# Patient Record
Sex: MALE | Race: WHITE
[De-identification: names, ages, dates, MRNs, and addresses within clinical notes are randomized per-mention and may not be internally consistent; named-entity substitution may affect disease eponyms.]

---

## 2018-04-25 ENCOUNTER — HOSPITAL ENCOUNTER (EMERGENCY)
Dept: HOSPITAL 25 - UCCORT | Age: 68
Discharge: HOME | End: 2018-04-25
Payer: MEDICARE

## 2018-04-25 VITALS — SYSTOLIC BLOOD PRESSURE: 130 MMHG | DIASTOLIC BLOOD PRESSURE: 89 MMHG

## 2018-04-25 DIAGNOSIS — J40: Primary | ICD-10-CM

## 2018-04-25 PROCEDURE — G0463 HOSPITAL OUTPT CLINIC VISIT: HCPCS

## 2018-04-25 PROCEDURE — 99212 OFFICE O/P EST SF 10 MIN: CPT

## 2018-04-25 PROCEDURE — 71046 X-RAY EXAM CHEST 2 VIEWS: CPT

## 2018-04-25 NOTE — UC
Respiratory Complaint HPI





- HPI Summary


HPI Summary: 





Pt presents with dry cough for the last 1.5 weeks. He was prescribed tessalon 

without being seen by a friend of his. Helps some, but cough still persisting. 

Also mentions that over the last 2 weeks he has last 15 pounds without trying - 

says he just doesnt feel like eating. Denies fever, chills, sore throat, chest 

pain, ORTEGA, SOB, abdominal pain, n/v/d/c.








- History of Current Complaint


Stated Complaint: COUGH


Time Seen by Provider: 04/25/18 12:13


Hx Obtained From: Patient


Onset/Duration: Gradual Onset


Character: Cough: Nonproductive





- Allergies/Home Medications


Allergies/Adverse Reactions: 


 Allergies











Allergy/AdvReac Type Severity Reaction Status Date / Time


 


No Known Allergies Allergy   Verified 04/25/18 12:12











Home Medications: 


 Home Medications





Aspirin 81 mg CHEW TAB* [Aspirin Low Dose TAB*] 81 mg PO DAILY 04/25/18 [

History Confirmed 04/25/18]


Atorvastatin* [Lipitor*] 10 mg PO DAILY 04/25/18 [History Confirmed 04/25/18]


Benzonatate CAP* [Tessalon 100 MG CAP*] 100 mg PO TID PRN 04/25/18 [History 

Confirmed 04/25/18]


Cholecalciferol TAB* [Vitamin D TAB*] 1,000 unit PO DAILY 04/25/18 [History 

Confirmed 04/25/18]


DULoxetine DR CAP* [Cymbalta CAP*] 60 mg PO DAILY 04/25/18 [History Confirmed 04 /25/18]


Folic Acid TAB* [Folvite TAB*] 1 mg PO DAILY 04/25/18 [History Confirmed 04/25/ 18]


Hydroxychloroquine TAB* [Plaquenil TAB*] 200 mg PO BID 04/25/18 [History 

Confirmed 04/25/18]


Methotrexate TAB* 3 tab PO Q7D 04/25/18 [History Confirmed 04/25/18]











PMH/Surg Hx/FS Hx/Imm Hx





- Additional Past Medical History


Additional PMH: 





Rheumatoid arthritis


Endocrine History: Dyslipidemia


Cardiovascular History: Hypertension





- Family History


Known Family History: Positive: Hypertension





- Social History


Occupation: Retired


Lives: With Family


Alcohol Use: Occasionally


Substance Use Type: None





Review of Systems


Constitutional: Negative


Skin: Negative


Eyes: Negative


ENT: Negative


Respiratory: Cough


Cardiovascular: Negative


Gastrointestinal: Negative


Neurovascular: Negative


Musculoskeletal: Negative


Neurological: Negative


Psychological: Negative


All Other Systems Reviewed And Are Negative: Yes





Physical Exam





- Summary


Physical Exam Summary: 





GENERAL: NAD. WDWN. No pain distress.


SKIN: No rashes, sores, ulcers, masses, lesions.


HEENT:


            Head: AT/NC


            Eyes:  EOM intact. Conjunctiva clear without inflammation or 

discharge.


            Ears: Hearing grossly normal. TMs intact, no bulging, erythema, or 

edema. 


            Nose: Nasal mucosa pink and moist. NTTP maxillary and frontal 

sinus. 


            Throat: Posterior oropharynx without exudates, erythema, or 

tonsillar enlargement.  Uvula midline.


NECK: Supple. Nontender. No lymphadenopathy. 


CHEST:  CTAB. No r/r/w. No accessory muscle use. Breathing comfortably and in 

no distress.


CV:  RRR. Without m/r/g. Pulses intact. Brisk cap refill.


NEURO: Alert. CN II-XII grossly intact.


PSYCH: Age appropriate behavior.


Triage Information Reviewed: Yes





Respiratory Course/Dx





- Course


Course Of Treatment: CXR: IMPRESSION: FINDINGS CONSISTENT WITH COPD, NO 

EVIDENCE FOR ACUTE FINDING.  Will rx for prednisone and cough syrup at bedtime 

and have him f/u with his PCP for his unintentional weight loss.





- Differential Dx/Diagnosis


Provider Diagnoses: Bronchitis





Discharge





- Sign-Out/Discharge


Documenting (check all that apply): Discharge/Admit/Transfer





- Discharge Plan


Condition: Stable


Disposition: HOME


Prescriptions: 


Codeine Phosphate/Guaifenesin [Guaifen-Codeine 100-10 mg/5 ml] 5 ml PO BEDTIME 

PRN #35 ml MDD 5mL


 PRN Reason: Cough


predniSONE TAB* [Deltasone TAB*] 50 mg PO DAILY #5 tab


Patient Education Materials:  Acute Bronchitis (ED)


Referrals: 


Christopher Haq MD [Primary Care Provider] - 


Additional Instructions: 


If you develop a fever, shortness of breath, chest pain, new or worsening 

symptoms - please call your PCP or go to the ED.


 





Your blood pressure was mildly elevated at todays visit. Please see your 

primary provider within 4 weeks for recheck and re-evaluation.








1) Please schedule a follow up within the next month to see your PCP regarding 

your recently unintentional weight loss.





- Billing Disposition and Condition


Condition: STABLE


Disposition: HOME

## 2018-04-25 NOTE — RAD
INDICATION:  Cough.



COMPARISON:  Comparison is made with a prior chest x-ray study from February 2, 2006.



TECHNIQUE: Dual-energy PA  and lateral views of the chest were obtained.



FINDINGS:   The heart is within normal limits in size. Mediastinal and hilar contours

appear within normal limits.



The lungs are clear. No pleural effusion is seen. There is flattening of the diaphragms

most consistent with chronic obstructive pulmonary disease. 



IMPRESSION:  FINDINGS CONSISTENT WITH COPD, NO EVIDENCE FOR ACUTE FINDING.